# Patient Record
Sex: MALE | Race: ASIAN | Employment: FULL TIME | URBAN - METROPOLITAN AREA
[De-identification: names, ages, dates, MRNs, and addresses within clinical notes are randomized per-mention and may not be internally consistent; named-entity substitution may affect disease eponyms.]

---

## 2017-01-04 ENCOUNTER — LAB ENCOUNTER (OUTPATIENT)
Dept: LAB | Facility: HOSPITAL | Age: 57
End: 2017-01-04
Payer: COMMERCIAL

## 2017-01-04 DIAGNOSIS — E11.9 TYPE II OR UNSPECIFIED TYPE DIABETES MELLITUS WITHOUT MENTION OF COMPLICATION, NOT STATED AS UNCONTROLLED: Primary | ICD-10-CM

## 2017-01-04 LAB
ALBUMIN SERPL-MCNC: 4.5 G/DL (ref 3.5–4.8)
ALP LIVER SERPL-CCNC: 89 U/L (ref 45–117)
ALT SERPL-CCNC: 33 U/L (ref 17–63)
AST SERPL-CCNC: 19 U/L (ref 15–41)
BILIRUB SERPL-MCNC: 1 MG/DL (ref 0.1–2)
BUN BLD-MCNC: 13 MG/DL (ref 8–20)
CALCIUM BLD-MCNC: 9 MG/DL (ref 8.3–10.3)
CHLORIDE: 104 MMOL/L (ref 101–111)
CO2: 28 MMOL/L (ref 22–32)
CREAT BLD-MCNC: 1.33 MG/DL (ref 0.7–1.3)
GLUCOSE BLD-MCNC: 141 MG/DL (ref 70–99)
M PROTEIN MFR SERPL ELPH: 8 G/DL (ref 6.1–8.3)
POTASSIUM SERPL-SCNC: 4.3 MMOL/L (ref 3.6–5.1)
SODIUM SERPL-SCNC: 138 MMOL/L (ref 136–144)

## 2017-01-04 PROCEDURE — 36415 COLL VENOUS BLD VENIPUNCTURE: CPT

## 2017-01-04 PROCEDURE — 80053 COMPREHEN METABOLIC PANEL: CPT

## 2017-05-05 ENCOUNTER — OFFICE VISIT (OUTPATIENT)
Dept: HEMATOLOGY/ONCOLOGY | Facility: HOSPITAL | Age: 57
End: 2017-05-05
Attending: SPECIALIST
Payer: COMMERCIAL

## 2017-05-05 VITALS
RESPIRATION RATE: 18 BRPM | TEMPERATURE: 98 F | WEIGHT: 154 LBS | HEART RATE: 84 BPM | OXYGEN SATURATION: 97 % | SYSTOLIC BLOOD PRESSURE: 126 MMHG | DIASTOLIC BLOOD PRESSURE: 72 MMHG | BODY MASS INDEX: 25.66 KG/M2 | HEIGHT: 65 IN

## 2017-05-05 DIAGNOSIS — D56.3 BETA THALASSEMIA MINOR: ICD-10-CM

## 2017-05-05 DIAGNOSIS — D50.0 IRON DEFICIENCY ANEMIA DUE TO CHRONIC BLOOD LOSS: ICD-10-CM

## 2017-05-05 DIAGNOSIS — Z12.5 PROSTATE CANCER SCREENING: ICD-10-CM

## 2017-05-05 DIAGNOSIS — D50.9 MICROCYTIC HYPOCHROMIC ANEMIA: Primary | ICD-10-CM

## 2017-05-05 PROCEDURE — 99244 OFF/OP CNSLTJ NEW/EST MOD 40: CPT | Performed by: SPECIALIST

## 2017-05-05 NOTE — PROGRESS NOTES
Patient is here today for Hematology Consult with Dr. Alec Ricketts. Patient denies pain. Medication list, medical history and toxicities were reviewed and updated.     Education Record    Learner:  Patient    Disease / Diagnosis: Consult    Barriers / Limitation

## 2017-05-14 PROBLEM — D50.9 MICROCYTIC HYPOCHROMIC ANEMIA: Status: ACTIVE | Noted: 2017-05-14

## 2017-05-14 PROBLEM — Z12.5 PROSTATE CANCER SCREENING: Status: ACTIVE | Noted: 2017-05-14

## 2017-05-14 PROBLEM — D50.0 IRON DEFICIENCY ANEMIA DUE TO CHRONIC BLOOD LOSS: Status: ACTIVE | Noted: 2017-05-14

## 2017-05-14 NOTE — PROGRESS NOTES
[facility] Report of Consultation      Patient Name: Chandana Cueva   YOB: 1960  Medical Record Number: [id number]  Consulting Physician: Florencio Alcala. Artie Kimble M.D.    Referring Physician: Aníbal Lawrence MD    Date of Consultation: 5 GlipiZIDE ER (GLIPIZIDE XL) 10 MG Oral Take 10 mg by mouth 2 (two) times daily. Disp:  Rfl:    FREESTYLE LITE TEST In Vitro Strip  Disp:  Rfl:      Allergies   Mr. Lambert Fernandez has No Known Allergies.        Review of Systems   Constitutional      No fevers masses. Hematologic/Lymphatic No cervical, supraclavicular, axillary or inguinal lymphadenopathy; no petechiae or purpura. Respiratory          Normal effort; no respiratory distress; lungs clear to auscultation bilaterally.   Cardiovascular     Regular r 1:41 PM   Result Value Ref Range   Prostate Specific Antigen Screen 0.43 0.01-4.00 ng/mL   -CBC W/ DIFFERENTIAL   Collection Time: 05/05/17  1:41 PM   Result Value Ref Range   WBC 5.8 4.0-13.0 x10(3) uL   RBC 5.03 4.30-5.70 x10(6)uL   HGB 9.0 (L) 13.0-17. 0 be repeated in one month to confirm response. Lastly, patient is advised to see his gastroenterologist, Dr. Milka Sanchez, for endoscopic evaluation. 2.   Prostate cancer screening: PSA is within normal limits.      Planned Follow Up  Patient will return for lab

## 2017-06-17 ENCOUNTER — APPOINTMENT (OUTPATIENT)
Dept: LAB | Age: 57
End: 2017-06-17
Attending: INTERNAL MEDICINE
Payer: COMMERCIAL

## 2017-06-17 DIAGNOSIS — D50.0 IRON DEFICIENCY ANEMIA SECONDARY TO BLOOD LOSS (CHRONIC): ICD-10-CM

## 2017-06-17 DIAGNOSIS — Z11.9 SCREENING EXAMINATION FOR INFECTIOUS DISEASE: ICD-10-CM

## 2017-06-17 PROCEDURE — 82784 ASSAY IGA/IGD/IGG/IGM EACH: CPT

## 2017-06-17 PROCEDURE — 86803 HEPATITIS C AB TEST: CPT

## 2017-06-17 PROCEDURE — 83516 IMMUNOASSAY NONANTIBODY: CPT

## 2017-06-17 PROCEDURE — 36415 COLL VENOUS BLD VENIPUNCTURE: CPT

## 2017-06-23 ENCOUNTER — LAB ENCOUNTER (OUTPATIENT)
Dept: LAB | Facility: HOSPITAL | Age: 57
End: 2017-06-23
Attending: INTERNAL MEDICINE
Payer: COMMERCIAL

## 2017-06-23 ENCOUNTER — HOSPITAL ENCOUNTER (OUTPATIENT)
Dept: MRI IMAGING | Facility: HOSPITAL | Age: 57
Discharge: HOME | End: 2017-06-23
Attending: INTERNAL MEDICINE
Payer: COMMERCIAL

## 2017-06-23 ENCOUNTER — TELEPHONE (OUTPATIENT)
Dept: HEMATOLOGY/ONCOLOGY | Facility: HOSPITAL | Age: 57
End: 2017-06-23

## 2017-06-23 DIAGNOSIS — D50.0 IRON DEFICIENCY ANEMIA DUE TO CHRONIC BLOOD LOSS: ICD-10-CM

## 2017-06-23 DIAGNOSIS — D50.0 IRON DEFICIENCY ANEMIA SECONDARY TO BLOOD LOSS (CHRONIC): ICD-10-CM

## 2017-06-23 PROCEDURE — 85045 AUTOMATED RETICULOCYTE COUNT: CPT

## 2017-06-23 PROCEDURE — 74183 MRI ABD W/O CNTR FLWD CNTR: CPT | Performed by: INTERNAL MEDICINE

## 2017-06-23 PROCEDURE — 83550 IRON BINDING TEST: CPT

## 2017-06-23 PROCEDURE — 36415 COLL VENOUS BLD VENIPUNCTURE: CPT

## 2017-06-23 PROCEDURE — 83540 ASSAY OF IRON: CPT

## 2017-06-23 PROCEDURE — A9575 INJ GADOTERATE MEGLUMI 0.1ML: HCPCS | Performed by: INTERNAL MEDICINE

## 2017-06-23 PROCEDURE — 72197 MRI PELVIS W/O & W/DYE: CPT | Performed by: INTERNAL MEDICINE

## 2017-06-23 PROCEDURE — 85025 COMPLETE CBC W/AUTO DIFF WBC: CPT

## 2017-06-23 PROCEDURE — 82728 ASSAY OF FERRITIN: CPT

## 2017-09-09 ENCOUNTER — LAB ENCOUNTER (OUTPATIENT)
Dept: LAB | Age: 57
End: 2017-09-09
Attending: INTERNAL MEDICINE
Payer: COMMERCIAL

## 2017-09-09 DIAGNOSIS — K21.9 GASTROESOPHAGEAL REFLUX DISEASE WITHOUT ESOPHAGITIS: ICD-10-CM

## 2017-09-09 DIAGNOSIS — D50.9 IRON DEFICIENCY ANEMIA, UNSPECIFIED IRON DEFICIENCY ANEMIA TYPE: ICD-10-CM

## 2017-09-09 DIAGNOSIS — Z79.4 TYPE 2 DIABETES MELLITUS WITHOUT COMPLICATION, WITH LONG-TERM CURRENT USE OF INSULIN (HCC): ICD-10-CM

## 2017-09-09 DIAGNOSIS — E11.9 TYPE 2 DIABETES MELLITUS WITHOUT COMPLICATION, WITH LONG-TERM CURRENT USE OF INSULIN (HCC): ICD-10-CM

## 2017-09-09 LAB
BASOPHILS # BLD AUTO: 0.01 X10(3) UL (ref 0–0.1)
BASOPHILS NFR BLD AUTO: 0.2 %
DEPRECATED HBV CORE AB SER IA-ACNC: 53.6 NG/ML (ref 22–322)
EOSINOPHIL # BLD AUTO: 0.05 X10(3) UL (ref 0–0.3)
EOSINOPHIL NFR BLD AUTO: 0.9 %
ERYTHROCYTE [DISTWIDTH] IN BLOOD BY AUTOMATED COUNT: 17.2 % (ref 11.5–16)
EST. AVERAGE GLUCOSE BLD GHB EST-MCNC: 200 MG/DL (ref 68–126)
HBA1C MFR BLD HPLC: 8.6 % (ref ?–5.7)
HCT VFR BLD AUTO: 36.2 % (ref 37–53)
HGB BLD-MCNC: 10.6 G/DL (ref 13–17)
IMMATURE GRANULOCYTE COUNT: 0.02 X10(3) UL (ref 0–1)
IMMATURE GRANULOCYTE RATIO %: 0.3 %
IRON SATURATION: 19 % (ref 13–45)
IRON: 85 UG/DL (ref 45–182)
LYMPHOCYTES # BLD AUTO: 2.2 X10(3) UL (ref 0.9–4)
LYMPHOCYTES NFR BLD AUTO: 38.3 %
MCH RBC QN AUTO: 19 PG (ref 27–33.2)
MCHC RBC AUTO-ENTMCNC: 29.3 G/DL (ref 31–37)
MCV RBC AUTO: 64.8 FL (ref 80–99)
MONOCYTES # BLD AUTO: 0.34 X10(3) UL (ref 0.1–0.6)
MONOCYTES NFR BLD AUTO: 5.9 %
NEUTROPHIL ABS PRELIM: 3.12 X10 (3) UL (ref 1.3–6.7)
NEUTROPHILS # BLD AUTO: 3.12 X10(3) UL (ref 1.3–6.7)
NEUTROPHILS NFR BLD AUTO: 54.4 %
PLATELET # BLD AUTO: 209 10(3)UL (ref 150–450)
RBC # BLD AUTO: 5.59 X10(6)UL (ref 4.3–5.7)
RED CELL DISTRIBUTION WIDTH-SD: 37.1 FL (ref 35.1–46.3)
TOTAL IRON BINDING CAPACITY: 450 UG/DL (ref 298–536)
TRANSFERRIN: 302 MG/DL (ref 200–360)
WBC # BLD AUTO: 5.7 X10(3) UL (ref 4–13)

## 2017-09-09 PROCEDURE — 36415 COLL VENOUS BLD VENIPUNCTURE: CPT

## 2017-09-09 PROCEDURE — 82728 ASSAY OF FERRITIN: CPT

## 2017-09-09 PROCEDURE — 85025 COMPLETE CBC W/AUTO DIFF WBC: CPT

## 2017-09-09 PROCEDURE — 83036 HEMOGLOBIN GLYCOSYLATED A1C: CPT

## 2017-09-09 PROCEDURE — 83540 ASSAY OF IRON: CPT

## 2017-09-09 PROCEDURE — 83550 IRON BINDING TEST: CPT

## 2017-12-22 ENCOUNTER — APPOINTMENT (OUTPATIENT)
Dept: LAB | Age: 57
End: 2017-12-22
Attending: INTERNAL MEDICINE
Payer: COMMERCIAL

## 2017-12-22 DIAGNOSIS — K21.9 GASTROESOPHAGEAL REFLUX DISEASE WITHOUT ESOPHAGITIS: ICD-10-CM

## 2017-12-22 DIAGNOSIS — D50.9 IRON DEFICIENCY ANEMIA, UNSPECIFIED IRON DEFICIENCY ANEMIA TYPE: ICD-10-CM

## 2017-12-22 PROCEDURE — 82272 OCCULT BLD FECES 1-3 TESTS: CPT

## 2017-12-26 ENCOUNTER — APPOINTMENT (OUTPATIENT)
Dept: LAB | Age: 57
End: 2017-12-26
Attending: INTERNAL MEDICINE
Payer: COMMERCIAL

## 2017-12-26 DIAGNOSIS — K21.9 GASTROESOPHAGEAL REFLUX DISEASE WITHOUT ESOPHAGITIS: ICD-10-CM

## 2017-12-26 DIAGNOSIS — D50.9 IRON DEFICIENCY ANEMIA, UNSPECIFIED IRON DEFICIENCY ANEMIA TYPE: ICD-10-CM

## 2017-12-26 PROCEDURE — 82272 OCCULT BLD FECES 1-3 TESTS: CPT

## 2017-12-29 ENCOUNTER — APPOINTMENT (OUTPATIENT)
Dept: LAB | Age: 57
End: 2017-12-29
Attending: INTERNAL MEDICINE
Payer: COMMERCIAL

## 2017-12-29 DIAGNOSIS — K21.9 GASTROESOPHAGEAL REFLUX DISEASE WITHOUT ESOPHAGITIS: ICD-10-CM

## 2017-12-29 DIAGNOSIS — D50.9 IRON DEFICIENCY ANEMIA, UNSPECIFIED IRON DEFICIENCY ANEMIA TYPE: ICD-10-CM

## 2017-12-29 PROCEDURE — 82272 OCCULT BLD FECES 1-3 TESTS: CPT

## 2018-05-11 PROBLEM — M75.42 IMPINGEMENT SYNDROME OF SHOULDER, LEFT: Status: ACTIVE | Noted: 2018-05-11

## 2018-09-08 PROCEDURE — 82570 ASSAY OF URINE CREATININE: CPT | Performed by: INTERNAL MEDICINE

## 2018-09-08 PROCEDURE — 82043 UR ALBUMIN QUANTITATIVE: CPT | Performed by: INTERNAL MEDICINE

## 2018-12-23 PROBLEM — Z98.890 HISTORY OF ARTHROSCOPY OF LEFT KNEE: Status: ACTIVE | Noted: 2018-12-23

## 2018-12-23 PROBLEM — M17.12 PRIMARY OSTEOARTHRITIS OF LEFT KNEE: Status: ACTIVE | Noted: 2018-12-23

## 2021-04-11 DIAGNOSIS — Z23 NEED FOR VACCINATION: ICD-10-CM

## 2024-01-25 ENCOUNTER — HOSPITAL ENCOUNTER (OUTPATIENT)
Dept: CV DIAGNOSTICS | Facility: HOSPITAL | Age: 64
Discharge: HOME | End: 2024-01-25
Attending: INTERNAL MEDICINE
Payer: COMMERCIAL

## 2024-01-25 DIAGNOSIS — R93.1 AGATSTON CAC SCORE, >400: ICD-10-CM

## 2024-01-25 PROCEDURE — 93350 STRESS TTE ONLY: CPT | Performed by: INTERNAL MEDICINE

## 2024-01-25 PROCEDURE — 93017 CV STRESS TEST TRACING ONLY: CPT | Performed by: INTERNAL MEDICINE

## (undated) NOTE — MR AVS SNAPSHOT
After Visit Summary   5/5/2017    Quentin Sewell    MRN: [id number]           Allergies     No Known Allergies      Your Vital Signs Were     BP Pulse Temp(Src) Resp    126/72 mmHg 84 97.8 °F (36.6 °C) (Tympanic) 18    Height Weight BMI SpO2    1. office, you can view your past visit information in 20x200 by going to Visits < Visit Summaries. 20x200 questions? Call [phone] for help. 20x200 is NOT to be used for urgent needs. For medical emergencies, dial 911.